# Patient Record
Sex: FEMALE | Race: WHITE | NOT HISPANIC OR LATINO | Employment: OTHER | URBAN - METROPOLITAN AREA
[De-identification: names, ages, dates, MRNs, and addresses within clinical notes are randomized per-mention and may not be internally consistent; named-entity substitution may affect disease eponyms.]

---

## 2017-11-30 ENCOUNTER — HISTORICAL (OUTPATIENT)
Dept: ADMINISTRATIVE | Facility: HOSPITAL | Age: 72
End: 2017-11-30

## 2022-04-10 ENCOUNTER — HISTORICAL (OUTPATIENT)
Dept: ADMINISTRATIVE | Facility: HOSPITAL | Age: 77
End: 2022-04-10

## 2022-04-24 VITALS
HEIGHT: 65 IN | BODY MASS INDEX: 23.66 KG/M2 | SYSTOLIC BLOOD PRESSURE: 135 MMHG | DIASTOLIC BLOOD PRESSURE: 89 MMHG | WEIGHT: 142 LBS | OXYGEN SATURATION: 95 %

## 2024-03-02 ENCOUNTER — HOSPITAL ENCOUNTER (EMERGENCY)
Facility: HOSPITAL | Age: 79
Discharge: HOME | End: 2024-03-02
Attending: EMERGENCY MEDICINE
Payer: MEDICARE

## 2024-03-02 VITALS
DIASTOLIC BLOOD PRESSURE: 80 MMHG | BODY MASS INDEX: 23.32 KG/M2 | WEIGHT: 140 LBS | OXYGEN SATURATION: 96 % | TEMPERATURE: 98 F | HEIGHT: 65 IN | HEART RATE: 79 BPM | RESPIRATION RATE: 16 BRPM | SYSTOLIC BLOOD PRESSURE: 149 MMHG

## 2024-03-02 DIAGNOSIS — R03.0 ELEVATED BLOOD PRESSURE READING: ICD-10-CM

## 2024-03-02 DIAGNOSIS — G43.909 MIGRAINE WITHOUT STATUS MIGRAINOSUS, NOT INTRACTABLE, UNSPECIFIED MIGRAINE TYPE: Primary | ICD-10-CM

## 2024-03-02 LAB
ALBUMIN SERPL-MCNC: 3.9 G/DL (ref 3.4–4.8)
ALBUMIN/GLOB SERPL: 1.1 RATIO (ref 1.1–2)
ALP SERPL-CCNC: 66 UNIT/L (ref 40–150)
ALT SERPL-CCNC: 9 UNIT/L (ref 0–55)
AST SERPL-CCNC: 13 UNIT/L (ref 5–34)
BASOPHILS # BLD AUTO: 0.05 X10(3)/MCL
BASOPHILS NFR BLD AUTO: 0.7 %
BILIRUB SERPL-MCNC: 0.3 MG/DL
BUN SERPL-MCNC: 10.2 MG/DL (ref 9.8–20.1)
CALCIUM SERPL-MCNC: 9.5 MG/DL (ref 8.4–10.2)
CHLORIDE SERPL-SCNC: 103 MMOL/L (ref 98–107)
CO2 SERPL-SCNC: 32 MMOL/L (ref 23–31)
CREAT SERPL-MCNC: 0.63 MG/DL (ref 0.55–1.02)
EOSINOPHIL # BLD AUTO: 0.19 X10(3)/MCL (ref 0–0.9)
EOSINOPHIL NFR BLD AUTO: 2.6 %
ERYTHROCYTE [DISTWIDTH] IN BLOOD BY AUTOMATED COUNT: 13.9 % (ref 11.5–17)
GFR SERPLBLD CREATININE-BSD FMLA CKD-EPI: >60 MLS/MIN/1.73/M2
GLOBULIN SER-MCNC: 3.4 GM/DL (ref 2.4–3.5)
GLUCOSE SERPL-MCNC: 110 MG/DL (ref 82–115)
HCT VFR BLD AUTO: 37.7 % (ref 37–47)
HGB BLD-MCNC: 11.9 G/DL (ref 12–16)
IMM GRANULOCYTES # BLD AUTO: 0.02 X10(3)/MCL (ref 0–0.04)
IMM GRANULOCYTES NFR BLD AUTO: 0.3 %
LYMPHOCYTES # BLD AUTO: 1.52 X10(3)/MCL (ref 0.6–4.6)
LYMPHOCYTES NFR BLD AUTO: 20.5 %
MCH RBC QN AUTO: 29.4 PG (ref 27–31)
MCHC RBC AUTO-ENTMCNC: 31.6 G/DL (ref 33–36)
MCV RBC AUTO: 93.1 FL (ref 80–94)
MONOCYTES # BLD AUTO: 0.64 X10(3)/MCL (ref 0.1–1.3)
MONOCYTES NFR BLD AUTO: 8.6 %
NEUTROPHILS # BLD AUTO: 4.99 X10(3)/MCL (ref 2.1–9.2)
NEUTROPHILS NFR BLD AUTO: 67.3 %
NRBC BLD AUTO-RTO: 0 %
OHS QRS DURATION: 76 MS
OHS QTC CALCULATION: 463 MS
PLATELET # BLD AUTO: 183 X10(3)/MCL (ref 130–400)
PMV BLD AUTO: 12.6 FL (ref 7.4–10.4)
POTASSIUM SERPL-SCNC: 4.5 MMOL/L (ref 3.5–5.1)
PROT SERPL-MCNC: 7.3 GM/DL (ref 5.8–7.6)
RBC # BLD AUTO: 4.05 X10(6)/MCL (ref 4.2–5.4)
SODIUM SERPL-SCNC: 142 MMOL/L (ref 136–145)
TROPONIN I SERPL-MCNC: <0.01 NG/ML (ref 0–0.04)
WBC # SPEC AUTO: 7.41 X10(3)/MCL (ref 4.5–11.5)

## 2024-03-02 PROCEDURE — 99285 EMERGENCY DEPT VISIT HI MDM: CPT | Mod: 25

## 2024-03-02 PROCEDURE — 25000003 PHARM REV CODE 250: Performed by: EMERGENCY MEDICINE

## 2024-03-02 PROCEDURE — 84484 ASSAY OF TROPONIN QUANT: CPT | Performed by: EMERGENCY MEDICINE

## 2024-03-02 PROCEDURE — 80053 COMPREHEN METABOLIC PANEL: CPT | Performed by: EMERGENCY MEDICINE

## 2024-03-02 PROCEDURE — 93005 ELECTROCARDIOGRAM TRACING: CPT

## 2024-03-02 PROCEDURE — 85025 COMPLETE CBC W/AUTO DIFF WBC: CPT | Performed by: EMERGENCY MEDICINE

## 2024-03-02 RX ORDER — AMLODIPINE BESYLATE 5 MG/1
5 TABLET ORAL
Status: COMPLETED | OUTPATIENT
Start: 2024-03-02 | End: 2024-03-02

## 2024-03-02 RX ORDER — HYDROXYZINE PAMOATE 25 MG/1
25 CAPSULE ORAL
Status: COMPLETED | OUTPATIENT
Start: 2024-03-02 | End: 2024-03-02

## 2024-03-02 RX ORDER — ACETAMINOPHEN 500 MG
1000 TABLET ORAL
Status: COMPLETED | OUTPATIENT
Start: 2024-03-02 | End: 2024-03-02

## 2024-03-02 RX ORDER — HYDROXYZINE PAMOATE 25 MG/1
25 CAPSULE ORAL EVERY 6 HOURS PRN
Qty: 30 CAPSULE | Refills: 0 | Status: SHIPPED | OUTPATIENT
Start: 2024-03-02

## 2024-03-02 RX ORDER — METOPROLOL SUCCINATE 50 MG/1
50 TABLET, EXTENDED RELEASE ORAL
Status: COMPLETED | OUTPATIENT
Start: 2024-03-02 | End: 2024-03-02

## 2024-03-02 RX ADMIN — AMLODIPINE BESYLATE 5 MG: 5 TABLET ORAL at 07:03

## 2024-03-02 RX ADMIN — METOPROLOL SUCCINATE 50 MG: 50 TABLET, EXTENDED RELEASE ORAL at 07:03

## 2024-03-02 RX ADMIN — ACETAMINOPHEN 1000 MG: 500 TABLET ORAL at 07:03

## 2024-03-02 RX ADMIN — HYDROXYZINE PAMOATE 25 MG: 25 CAPSULE ORAL at 08:03

## 2024-03-02 NOTE — ED PROVIDER NOTES
Encounter Date: 3/2/2024    SCRIBE #1 NOTE: I, Roverto Obrien, am scribing for, and in the presence of,  Dr. Varner. I have scribed the following portions of the note - Other sections scribed: HPI, ROS, Physical Exam, MDM, Attending.       History     Chief Complaint   Patient presents with    Headache     Pt c/o HA and nausea that started this morning, denies trauma, chest pain, vision changes. Reports this happens when her BP spikes.      79 y/o female with history of HTN and COPD on 2L O2 at home presents to ED for constant R retroorbital HA onset earlier this morning.  Pt states she stayed up all night watching a movie.  She states the HA is similar to those she has when her blood pressure is elevated, so she checked her BP, which was 175 systolic.  She last took her HTN meds around 2100 last night, which is her normal time.  She also reports some mild neck pain and mild nausea.  She last had tylenol about 4 hours ago.  Pt denies vomiting, visual changes or chest pain.    The history is provided by the patient.     Review of patient's allergies indicates:   Allergen Reactions    Codeine Other (See Comments)     honey     Past Medical History:   Diagnosis Date    Benign essential HTN     COPD (chronic obstructive pulmonary disease)      Past Surgical History:   Procedure Laterality Date    CHOLECYSTECTOMY       No family history on file.  Social History     Tobacco Use    Smoking status: Former     Types: Cigarettes     Review of Systems   Eyes:  Negative for visual disturbance.   Cardiovascular:  Negative for chest pain.   Gastrointestinal:  Positive for nausea (mild). Negative for vomiting.   Musculoskeletal:  Positive for neck pain (mild).   Neurological:  Positive for headaches.       Physical Exam     Initial Vitals [03/02/24 0701]   BP Pulse Resp Temp SpO2   131/69 80 18 97.5 °F (36.4 °C) 98 %      MAP       --         Physical Exam    Nursing note and vitals reviewed.  Constitutional: She appears  well-developed and well-nourished. She is not diaphoretic. She does not appear ill. No distress.   HENT:   Head: Normocephalic and atraumatic.   Right Ear: External ear normal.   Left Ear: External ear normal.   Mouth/Throat: Oropharynx is clear and moist.   Eyes: Conjunctivae are normal. Pupils are equal, round, and reactive to light.   Neck: Neck supple. No tracheal deviation present.   Cardiovascular:  Normal rate, regular rhythm and normal heart sounds.           No murmur heard.  Pulmonary/Chest: Breath sounds normal. No respiratory distress. She has no wheezes. She has no rhonchi. She has no rales.   Abdominal: Abdomen is soft. She exhibits no distension. There is no abdominal tenderness.   No right CVA tenderness.  No left CVA tenderness.   Musculoskeletal:         General: Normal range of motion.      Cervical back: Neck supple.     Neurological: She is alert and oriented to person, place, and time. GCS score is 15. GCS eye subscore is 4. GCS verbal subscore is 5. GCS motor subscore is 6.   Skin: Skin is warm and dry. Capillary refill takes less than 2 seconds. No rash noted. No pallor.         ED Course   Procedures  Labs Reviewed   COMPREHENSIVE METABOLIC PANEL - Abnormal; Notable for the following components:       Result Value    Carbon Dioxide 32 (*)     All other components within normal limits   CBC WITH DIFFERENTIAL - Abnormal; Notable for the following components:    RBC 4.05 (*)     Hgb 11.9 (*)     MCHC 31.6 (*)     MPV 12.6 (*)     All other components within normal limits   TROPONIN I - Normal   CBC W/ AUTO DIFFERENTIAL    Narrative:     The following orders were created for panel order CBC auto differential.  Procedure                               Abnormality         Status                     ---------                               -----------         ------                     CBC with Differential[[id number]]        Abnormal            Final result                 Please view results for  these tests on the individual orders.     EKG Readings: (Independently Interpreted)   Initial Reading: No STEMI. Rhythm: Normal Sinus Rhythm. Heart Rate: 84. Ectopy: No Ectopy. Conduction: Normal. ST Segments: Normal ST Segments. T Waves: Normal. Axis: Normal.   Performed at 0747       Imaging Results              CT Head Without Contrast (Final result)  Result time 03/02/24 08:17:28      Final result by Dave Bass MD (03/02/24 08:17:28)                   Impression:      1.  No acute intracranial findings identified.    2.  Old appearing lacunar infarcts, mild chronic microvascular ischemia and mild atrophy.      Electronically signed by: Dave Bass  Date:    03/02/2024  Time:    08:17               Narrative:    EXAMINATION:  CT HEAD WITHOUT CONTRAST    CLINICAL HISTORY:  Headache, new or worsening (Age >= 50y);    TECHNIQUE:  Sequential axial images were performed of the brain without contrast.    Dose product length of 889 mGycm. Automated exposure control was utilized to minimize radiation dose.    COMPARISON:  None available.    FINDINGS:  There is no intracranial mass effect, midline shift, hydrocephalus or hemorrhage. There is no sulcal effacement or low attenuation changes to suggest recent large vessel territory infarction.  There are left basal ganglia and left frontal lobe old appearing lacunar infarcts.  Chronic appearing periventricular and subcortical white matter low attenuation changes are present and are consistent with chronic microangiopathic ischemia. The ventricular system and sulcal markings prominence is consistent with atrophy. There is no acute extra axial fluid collection. Visualized paranasal sinuses are clear without mucosal thickening, polypoidal abnormality or air-fluid levels. Mastoid air cells aeration is optimal.                                    X-Rays:   Independently Interpreted Readings:   Head CT: No acute intracranial process      Medications   amLODIPine tablet 5 mg  (5 mg Oral Given 3/2/24 0720)   metoprolol succinate (TOPROL-XL) 24 hr tablet 50 mg (50 mg Oral Given 3/2/24 0720)   acetaminophen tablet 1,000 mg (1,000 mg Oral Given 3/2/24 0720)   hydrOXYzine pamoate capsule 25 mg (25 mg Oral Given 3/2/24 0843)     Medical Decision Making  79 yo female with headache     Differential diagnoses include, but are not limited to: hypertensive urgency vs emergency, tension headache, migraine, sleep deprivation, dehydration, anxiety     ED management:  BP slightly elevated, given home meds Amlodipine and Metoprolol  Given Tylenol 1000 mg PO   Labs and CT head are unremarkable   Appears to be anxiety component as well, given Vistaril 25 mg   Improved with Vistaril       Problems Addressed:  Elevated blood pressure reading: acute illness or injury that poses a threat to life or bodily functions  Migraine without status migrainosus, not intractable, unspecified migraine type: acute illness or injury that poses a threat to life or bodily functions    Amount and/or Complexity of Data Reviewed  Independent Historian:      Details: Daughter- see ED course   Labs: ordered. Decision-making details documented in ED Course.  Radiology: ordered and independent interpretation performed. Decision-making details documented in ED Course.  ECG/medicine tests: ordered and independent interpretation performed. Decision-making details documented in ED Course.    Risk  OTC drugs.  Prescription drug management.            Scribe Attestation:   Scribe #1: I performed the above scribed service and the documentation accurately describes the services I performed. I attest to the accuracy of the note.    Attending Attestation:           Physician Attestation for Scribe:  Physician Attestation Statement for Scribe #1: I, reviewed documentation, as scribed by Roverto Obrien in my presence, and it is both accurate and complete.             ED Course as of 03/02/24 0935   Sat Mar 02, 2024   0832 Daughter at bedside  reports that when patient's spouse goes out of town, she can't sleep and gets anxious. Her PCP has prescribed her anxiety medication but patient would not get it filled because she is scared to take it.  [KM]   0930 Patient is feeling better after Vistaril, will send Rx  [KM]      ED Course User Index  [KM] Miriam Varenr MD                           Clinical Impression:  Final diagnoses:  [G43.909] Migraine without status migrainosus, not intractable, unspecified migraine type (Primary)  [R03.0] Elevated blood pressure reading          ED Disposition Condition    Discharge Stable          ED Prescriptions       Medication Sig Dispense Start Date End Date Auth. Provider    hydrOXYzine pamoate (VISTARIL) 25 MG Cap Take 1 capsule (25 mg total) by mouth every 6 (six) hours as needed (anxiety). 30 capsule 3/2/2024 -- Miriam Varner MD          Follow-up Information       Follow up With Specialties Details Why Contact Info    Alfredo Gomez MD Family Medicine Schedule an appointment as soon as possible for a visit   [address] [place] [id number] [phone]      [facility] - Emergency Dept Emergency Medicine  As needed, If symptoms worsen [address] [place] [id number] [phone]             Miriam Varner MD  03/02/24 4635